# Patient Record
Sex: FEMALE | Race: OTHER | NOT HISPANIC OR LATINO
[De-identification: names, ages, dates, MRNs, and addresses within clinical notes are randomized per-mention and may not be internally consistent; named-entity substitution may affect disease eponyms.]

---

## 2017-01-25 ENCOUNTER — RESULT REVIEW (OUTPATIENT)
Age: 32
End: 2017-01-25

## 2017-07-31 ENCOUNTER — APPOINTMENT (OUTPATIENT)
Dept: RADIOLOGY | Facility: HOSPITAL | Age: 32
End: 2017-07-31

## 2017-07-31 ENCOUNTER — OUTPATIENT (OUTPATIENT)
Dept: OUTPATIENT SERVICES | Facility: HOSPITAL | Age: 32
LOS: 1 days | Discharge: HOME | End: 2017-07-31
Payer: MEDICAID

## 2017-07-31 DIAGNOSIS — Z30.2 ENCOUNTER FOR STERILIZATION: ICD-10-CM

## 2017-07-31 DIAGNOSIS — Z32.01 ENCOUNTER FOR PREGNANCY TEST, RESULT POSITIVE: ICD-10-CM

## 2017-07-31 LAB — HCG SERPL-ACNC: <1 MIU/ML — SIGNIFICANT CHANGE UP

## 2017-07-31 PROCEDURE — 58340 CATHETER FOR HYSTEROGRAPHY: CPT

## 2017-07-31 PROCEDURE — 74740 X-RAY FEMALE GENITAL TRACT: CPT | Mod: 26

## 2017-08-08 ENCOUNTER — EMERGENCY (EMERGENCY)
Facility: HOSPITAL | Age: 32
LOS: 1 days | Discharge: HOME | End: 2017-08-08
Attending: EMERGENCY MEDICINE | Admitting: EMERGENCY MEDICINE
Payer: OTHER MISCELLANEOUS

## 2017-08-08 VITALS
RESPIRATION RATE: 16 BRPM | DIASTOLIC BLOOD PRESSURE: 83 MMHG | OXYGEN SATURATION: 100 % | HEART RATE: 80 BPM | SYSTOLIC BLOOD PRESSURE: 129 MMHG | TEMPERATURE: 98 F

## 2017-08-08 DIAGNOSIS — Z87.39 PERSONAL HISTORY OF OTHER DISEASES OF THE MUSCULOSKELETAL SYSTEM AND CONNECTIVE TISSUE: Chronic | ICD-10-CM

## 2017-08-08 PROCEDURE — 99284 EMERGENCY DEPT VISIT MOD MDM: CPT | Mod: 25

## 2017-08-08 PROCEDURE — 99053 MED SERV 10PM-8AM 24 HR FAC: CPT

## 2017-08-08 RX ORDER — IBUPROFEN 200 MG
400 TABLET ORAL ONCE
Qty: 0 | Refills: 0 | Status: COMPLETED | OUTPATIENT
Start: 2017-08-08 | End: 2017-08-08

## 2017-08-08 RX ORDER — DIAZEPAM 5 MG
1 TABLET ORAL
Qty: 10 | Refills: 0 | OUTPATIENT
Start: 2017-08-08

## 2017-08-08 RX ORDER — IBUPROFEN 200 MG
1 TABLET ORAL
Qty: 12 | Refills: 0 | OUTPATIENT
Start: 2017-08-08

## 2017-08-08 RX ADMIN — Medication 400 MILLIGRAM(S): at 09:51

## 2017-08-08 NOTE — ED PROVIDER NOTE - CROS ED NEURO POS
DIFFICULTY WALKING / IMBALANCE/alternating, bilateral tingling and weakness of upper and lower extremities

## 2017-08-08 NOTE — ED ADULT TRIAGE NOTE - CHIEF COMPLAINT QUOTE
Pt. c/o lower back pain radiating to L leg started on Friday.  Pt. stated he have fallen a couple of months ago and has been suffering from back pain since.

## 2017-08-08 NOTE — ED PROVIDER NOTE - OBJECTIVE STATEMENT
33yo f w history chronic lower back pain and chronic right wrist pain secondary to fall in 2016 here complaining of 3 days worsening back pain and 2 days worsening wrist pain. 33yo f w history chronic lower back pain and chronic right wrist pain secondary to fall in 2016 here complaining of 3 days worsening back pain and 2 days worsening wrist pain. Has had mild back pain since injury in 2016 but acutely got worse. Located in lower back, radiates down legs to both sides, left > right. Also complains of intermittent bilateral weakness that alternates sides and intermittent numbness and tingling that also alternates sides 33yo f w history chronic lower back pain and chronic right wrist pain secondary to fall in 2016 here complaining of 3 days worsening back pain and 2 days worsening wrist pain. Has had mild back pain since injury in 2016 but acutely got worse. Located in lower back, radiates down legs to both sides, left > right. Also complains of intermittent bilateral weakness that alternates sides and intermittent numbness and tingling that also alternates sides. Denies urinary or fecal retention or incontinence. No saddle anesthesia. No IV drug use.   For wrist, had surgery for Dequervain's synovitis 2 mos ago, before which had pain localized in right radial hand, now pain has begun traveling up on arm. Was told she may have carpal tunnel syndrome; works in medical billing, typing throughout day. Follows with ortho for her wrist, and sees pain management who have been prescribing tramadol and lyrica for past 2 months. Denies pain in knees, upper arms. 33yo f w history chronic lower back pain and chronic right wrist pain secondary to fall in 2016 here complaining of 3 days worsening back pain and 2 days worsening wrist pain. Has had mild back pain since injury in 2016 but acutely got worse. Located in lower back, radiates down legs to both sides, left > right. Also complains of intermittent bilateral weakness that alternates sides and intermittent numbness and tingling that also alternates sides. Denies urinary or fecal retention or incontinence. No saddle anesthesia. No IV drug use.   For wrist, had surgery for Dequervain's synovitis 2 mos ago, before which had pain localized in right radial hand, now pain has begun traveling up on arm. Was told she may have carpal tunnel syndrome; works in medical billing, typing throughout day. Follows with ortho for her wrist, and sees pain management who have been prescribing tramadol and Lyrica for past 2 months. Denies pain in knees, upper arms, and other joints.

## 2017-08-08 NOTE — ED PROVIDER NOTE - PLAN OF CARE
You were seen today for your back pain.  It is likely due to sciatica, a pinched back nerve.  Take ibuprofen and the prescribed muscle relaxant as directed.  Be sure to follow up with your pain doctor in 2-3 days for re-evaluation.  RETURN TO THE EMERGENCY DEPARTMENT IMMEDIATELY IF YOU DEVELOP A FEVER, HAVE DIFFICULTY URINATING, CANNOT WALK, OR FOR ANY OTHER CONCERN.

## 2017-08-08 NOTE — ED PROVIDER NOTE - CARE PLAN
Principal Discharge DX:	Sciatica of left side  Instructions for follow-up, activity and diet:	You were seen today for your back pain.  It is likely due to sciatica, a pinched back nerve.  Take ibuprofen and the prescribed muscle relaxant as directed.  Be sure to follow up with your pain doctor in 2-3 days for re-evaluation.  RETURN TO THE EMERGENCY DEPARTMENT IMMEDIATELY IF YOU DEVELOP A FEVER, HAVE DIFFICULTY URINATING, CANNOT WALK, OR FOR ANY OTHER CONCERN.  Secondary Diagnosis:	Carpal tunnel syndrome of right wrist

## 2017-08-08 NOTE — ED PROVIDER NOTE - MEDICAL DECISION MAKING DETAILS
33yo female w history chronic pain here for acutely worsening lower back pain without features concerning for cord compression. Given pain elicited with straight leg on left, patient may have herniated lumbar disc. Will manage pain with trial of valium and ibuprofen. Right wrist exam with positive Tinel's and history frequent typing suggests carpal tunnel syndrome. Patient stable for discharge with outpatient orthopedic follow-up for both wrist and back pain.

## 2017-08-08 NOTE — ED PROVIDER NOTE - ATTENDING CONTRIBUTION TO CARE
DR. CAMACHO, ATTENDING MD-  I performed a face to face bedside interview with patient regarding history of present illness, review of symptoms and past medical history. I completed an independent physical exam.  I have discussed patient's plan of care with the resident.   Documentation as above in the note.    33 y/o female chronic back pain with acute worsening over past three days.  Back pain is left lower region, rad to lle.  Able to wt bear but worsens pain.  No ivda, trauma, ca hx, bladder/bowel incont.  Denies f/c, ha, neck stiffness, cp, sob, cough, abd pain, n/v/d, dysuria, rash.  Afebrile, vs wnl, uncomfortable, ctabil, s1s2 rrr no m/r/g, abd soft non ttp no r/g, back exam:  no midline ttp, no step offs, no erythema, ttp over b/l paraspinal lumbar region, pos slr test on left at 30 deg, pos tinels on right wrist distally nv intact, motor 5/5 x4 ext, distal sensory grossly intact x4 ext, antalgic gait but able to wt bear, normal heel to shin, normal finger to nose.  Exam and hx c/w carpal tunnel and sciatica, no signs of cord compression.  Advise nsaids, muscle relaxant prn, pain doc f/u.

## 2017-12-26 ENCOUNTER — TRANSCRIPTION ENCOUNTER (OUTPATIENT)
Age: 32
End: 2017-12-26

## 2018-05-15 ENCOUNTER — EMERGENCY (EMERGENCY)
Facility: HOSPITAL | Age: 33
LOS: 1 days | Discharge: HOME | End: 2018-05-15
Attending: EMERGENCY MEDICINE | Admitting: EMERGENCY MEDICINE
Payer: COMMERCIAL

## 2018-05-15 ENCOUNTER — TRANSCRIPTION ENCOUNTER (OUTPATIENT)
Age: 33
End: 2018-05-15

## 2018-05-15 VITALS
WEIGHT: 184.09 LBS | OXYGEN SATURATION: 98 % | SYSTOLIC BLOOD PRESSURE: 113 MMHG | DIASTOLIC BLOOD PRESSURE: 77 MMHG | TEMPERATURE: 98 F | HEART RATE: 74 BPM | RESPIRATION RATE: 17 BRPM | HEIGHT: 59 IN

## 2018-05-15 DIAGNOSIS — Z87.39 PERSONAL HISTORY OF OTHER DISEASES OF THE MUSCULOSKELETAL SYSTEM AND CONNECTIVE TISSUE: Chronic | ICD-10-CM

## 2018-05-15 PROCEDURE — 99283 EMERGENCY DEPT VISIT LOW MDM: CPT

## 2018-05-15 RX ORDER — IBUPROFEN 200 MG
600 TABLET ORAL ONCE
Qty: 0 | Refills: 0 | Status: COMPLETED | OUTPATIENT
Start: 2018-05-15 | End: 2018-05-15

## 2018-05-15 RX ORDER — LIDOCAINE 4 G/100G
1 CREAM TOPICAL ONCE
Qty: 0 | Refills: 0 | Status: COMPLETED | OUTPATIENT
Start: 2018-05-15 | End: 2018-05-15

## 2018-05-15 RX ORDER — DEXAMETHASONE 0.5 MG/5ML
8 ELIXIR ORAL ONCE
Qty: 0 | Refills: 0 | Status: COMPLETED | OUTPATIENT
Start: 2018-05-15 | End: 2018-05-15

## 2018-05-15 RX ADMIN — Medication 8 MILLIGRAM(S): at 19:47

## 2018-05-15 RX ADMIN — LIDOCAINE 1 PATCH: 4 CREAM TOPICAL at 19:50

## 2018-05-15 RX ADMIN — Medication 600 MILLIGRAM(S): at 19:49

## 2018-05-15 RX ADMIN — LIDOCAINE 1 PATCH: 4 CREAM TOPICAL at 19:47

## 2018-05-15 RX ADMIN — Medication 600 MILLIGRAM(S): at 19:47

## 2018-05-15 NOTE — ED ADULT TRIAGE NOTE - CHIEF COMPLAINT QUOTE
pain in the right wrist sp fall in 2017 had an MRI in March which showed two partial tears to the triangular fibrocartilage and scapholunate ligament has been taking gralise but for the past two days it hasn't been helping with her pain

## 2018-05-15 NOTE — ED PROVIDER NOTE - PROGRESS NOTE DETAILS
janeen: only mild improvement in pain, pt feels comfortable w/ f/u outpt ortho janeen: PT seen and reassessed.  Patient symptomatically improved.   AAOX3, NAD, VSS.  Discussed test results w/ patient. Patient verbalized understanding of hospital course and outpatient plans, has decisional making capacity.  Will f/u w/ pmd in the next few days; patient will call for an appointment. Will return to the ED if there is any worsening of symptoms.  Patient able to ambulate at baseline, is tolerating PO intake

## 2018-05-15 NOTE — ED PROVIDER NOTE - OBJECTIVE STATEMENT
32yo f p/w R wrist pain.  Had  fall in 2017 had an MRI in March which showed two partial tears to the triangular fibrocartilage and scapholunate ligament.  has been taking gabapentin but for the past 2d it hasn't been helping with her pain. 34yo f p/w R wrist pain. pain is most at R ulnar syloid process but goes around entire wrist. Had  ADITI in 2017 s/p dequervian synovitis surgery 1 year ago. had an MRI in March due to persistent pain which showed two partial tears to the triangular fibrocartilage and scapholunate ligament. is scheduled to have ligament repair w/ dr box but is awaiting insurance approval. has been taking gabapentin, 400mg advil but for the past 2d it hasn't been helping with her pain. ROS negative for: fever, c Nausea, vomiting, trauma, injury, redness of wrist 32yo f p/w R wrist pain. pain is most at R ulnar syloid process but goes around entire wrist. Had  ADITI in 2017 s/p dequervain synovitis surgery 1 year ago. had an MRI in March due to persistent pain which showed two partial tears to the triangular fibrocartilage and scapholunate ligament. is scheduled to have ligament repair w/ dr box but is awaiting insurance approval. has been taking gabapentin, 400mg advil but for the past 2d it hasn't been helping with her pain. ROS negative for: fever, c Nausea, vomiting, trauma, injury, redness of wrist 34yo f p/w R wrist pain. pain is most at R ulnar styloid process but goes around entire wrist. Had  ADITI in 2017 s/p dequervain synovitis surgery 1 year ago. had an MRI in March due to persistent pain which showed two partial tears to the triangular fibrocartilage and scapholunate ligament. is scheduled to have ligament repair w/ dr box but is awaiting insurance approval. has been taking gabapentin, 400mg advil but for the past 2d it hasn't been helping with her pain. ROS negative for: fever, Nausea, vomiting, trauma, injury, redness of wrist

## 2018-05-15 NOTE — ED ADULT NURSE NOTE - OBJECTIVE STATEMENT
32 yo female presents to ED for right wrist pain. She denies injury today. Pt states she had an injury to the right wrist 2 years ago that required surgical intervention. She has had chronic pain since then. Today comes to ED for 10/10 throbbing in the right wrist not relieved by advil or ice. Pt can range wrist in all directions and wiggle fingers. There is equal sensation orestes hands. There is no obvious deformity. She reports she normally has decreased strength is right hand due to pain and that is normal for her. R. wrist is tender to palpation. Cap refill <2 sec. Skin is warm and dry. She is well apparing.  at bedside. NAD. Safety and comfort maintained. ill continue to monitor.

## 2018-05-15 NOTE — ED PROVIDER NOTE - MEDICAL DECISION MAKING DETAILS
ttp styloid process ulnar & radial; + snuffbox ttp likely partial tears to the triangular fibrocartilage and scapholunate ligament. is scheduled to have ligament repair w/ dr box but is awaiting insurance approval. will give analgesia. no indications for xray currently given no trauma. clininically no suspicion for cellulitis, osteomyelitis, septic arthritis

## 2018-05-15 NOTE — ED PROVIDER NOTE - CHIEF COMPLAINT
The patient is a 33y Female complaining of The patient is a 33y Female complaining of right wrist pain.

## 2018-05-15 NOTE — ED PROVIDER NOTE - ATTENDING CONTRIBUTION TO CARE
34 y/o female with the above documented history and HPI who on exam appears well and comfortable. R wrist appears symmetric with the L s/ erythema, edema, warmth or ecchymosis. It exhibits a slightly reduced ROM secondary to pain. Neurovascularly intact. Nothing clinically evident to suggest any emergent process or alternative etiology of her pain; e.g., fracture, dislocation, septic arthritis, thromboembolic or other vascular event, etc. Provided analgesia here and to be discharged.

## 2018-05-21 PROCEDURE — 99283 EMERGENCY DEPT VISIT LOW MDM: CPT

## 2020-02-24 ENCOUNTER — APPOINTMENT (OUTPATIENT)
Dept: PAIN MANAGEMENT | Facility: CLINIC | Age: 35
End: 2020-02-24

## 2020-04-25 ENCOUNTER — MESSAGE (OUTPATIENT)
Age: 35
End: 2020-04-25

## 2020-08-17 ENCOUNTER — APPOINTMENT (OUTPATIENT)
Dept: PAIN MANAGEMENT | Facility: CLINIC | Age: 35
End: 2020-08-17

## 2021-03-12 ENCOUNTER — APPOINTMENT (OUTPATIENT)
Dept: ENDOCRINOLOGY | Facility: CLINIC | Age: 36
End: 2021-03-12

## 2021-03-22 ENCOUNTER — RESULT REVIEW (OUTPATIENT)
Age: 36
End: 2021-03-22

## 2022-04-18 ENCOUNTER — RESULT REVIEW (OUTPATIENT)
Age: 37
End: 2022-04-18

## 2023-08-04 ENCOUNTER — EMERGENCY (EMERGENCY)
Age: 38
LOS: 1 days | Discharge: HOME | End: 2023-08-04
Attending: EMERGENCY MEDICINE | Admitting: EMERGENCY MEDICINE
Payer: COMMERCIAL

## 2023-08-04 VITALS
OXYGEN SATURATION: 99 % | WEIGHT: 177.47 LBS | TEMPERATURE: 98 F | DIASTOLIC BLOOD PRESSURE: 86 MMHG | RESPIRATION RATE: 14 BRPM | SYSTOLIC BLOOD PRESSURE: 123 MMHG | HEART RATE: 79 BPM

## 2023-08-04 DIAGNOSIS — Z87.39 PERSONAL HISTORY OF OTHER DISEASES OF THE MUSCULOSKELETAL SYSTEM AND CONNECTIVE TISSUE: Chronic | ICD-10-CM

## 2023-08-04 PROCEDURE — 99284 EMERGENCY DEPT VISIT MOD MDM: CPT | Mod: 25

## 2023-08-04 PROCEDURE — 12011 RPR F/E/E/N/L/M 2.5 CM/<: CPT

## 2023-08-04 PROCEDURE — 93010 ELECTROCARDIOGRAM REPORT: CPT

## 2023-08-04 RX ORDER — TETANUS TOXOID, REDUCED DIPHTHERIA TOXOID AND ACELLULAR PERTUSSIS VACCINE, ADSORBED 5; 2.5; 8; 8; 2.5 [IU]/.5ML; [IU]/.5ML; UG/.5ML; UG/.5ML; UG/.5ML
0.5 SUSPENSION INTRAMUSCULAR ONCE
Refills: 0 | Status: COMPLETED | OUTPATIENT
Start: 2023-08-04 | End: 2023-08-04

## 2023-08-04 RX ADMIN — TETANUS TOXOID, REDUCED DIPHTHERIA TOXOID AND ACELLULAR PERTUSSIS VACCINE, ADSORBED 0.5 MILLILITER(S): 5; 2.5; 8; 8; 2.5 SUSPENSION INTRAMUSCULAR at 11:22

## 2023-08-04 NOTE — ED PROVIDER NOTE - RAPID ASSESSMENT
2cm lac to forehead after fall.  Pt clinically stable, +headache, no vomiting.  Transfer to adult side for full eval and closure, discussed w/ attg. -Laura Lares MD

## 2023-08-04 NOTE — ED PROVIDER NOTE - PATIENT PORTAL LINK FT
You can access the FollowMyHealth Patient Portal offered by [facility] by registering at the following website: [email or web address]. By joining [facility]’s FollowMyHealth portal, you will also be able to view your health information using other applications (apps) compatible with our system.

## 2023-08-04 NOTE — ED PROVIDER NOTE - NSFOLLOWUPINSTRUCTIONS_ED_ALL_ED_FT
You had repair of a laceration to your left forehead with 3 sutures.     Soap and water okay to rinse wound. Do not scrub wound.    Return to the ER or any other doctor in 5-7 days for suture removal.      Return to the ER for any persistent/worsening symptoms or if you experience any new symptoms such as fever, redness, numbness , you notice a new rash, increased irritation, abnormal discharge, or any concerning symptoms.
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
## 2023-08-04 NOTE — ED PROVIDER NOTE - CHIEF COMPLAINT
The patient is a 38y Female complaining of  The patient is a 38y Female complaining of injury to head.

## 2023-08-04 NOTE — ED PROVIDER NOTE - CLINICAL SUMMARY MEDICAL DECISION MAKING FREE TEXT BOX
Patient is a large pericardial effusion 38-year-old female no past medical history presenting with injury to head. Does not meet criteeria for head CT by [place] CT rule. On exam patient with 1 cm laceration to left eyebrow.  Minimal bleeding.  No other signs of trauma.  Patient to get Tdap. Will repair laceration.
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
## 2023-08-04 NOTE — ED PROVIDER NOTE - PROGRESS NOTE DETAILS
Oneida Kenney MD PGY3: Laceration repaired with 3 sutures.  Patient to be discharged.  Instructed to follow-up for suture removal.
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
## 2023-08-04 NOTE — ED PROVIDER NOTE - PHYSICAL EXAMINATION
GENERAL: no acute distress, non-toxic appearing  HEAD: normocephalic, 1cm laceration to left forehead above left eyebrow  HEENT: PERRLA, EOMI, normal conjunctiva  CARDIAC: regular rate and rhythm  PULM: clear to ascultation bilaterally  NEURO: alert and oriented x 3, normal speech, no gross neurologic deficit  MSK: no visible deformities  SKIN: no visible rashes, dry, well-perfused

## 2023-10-17 ENCOUNTER — APPOINTMENT (OUTPATIENT)
Dept: BARIATRICS/WEIGHT MGMT | Facility: CLINIC | Age: 38
End: 2023-10-17
Payer: COMMERCIAL

## 2023-10-17 ENCOUNTER — OUTPATIENT (OUTPATIENT)
Dept: OUTPATIENT SERVICES | Facility: HOSPITAL | Age: 38
LOS: 1 days | End: 2023-10-17
Payer: COMMERCIAL

## 2023-10-17 VITALS — WEIGHT: 180 LBS | HEIGHT: 59 IN | BODY MASS INDEX: 36.29 KG/M2

## 2023-10-17 DIAGNOSIS — E11.9 TYPE 2 DIABETES MELLITUS W/OUT COMPLICATIONS: ICD-10-CM

## 2023-10-17 DIAGNOSIS — Z87.39 PERSONAL HISTORY OF OTHER DISEASES OF THE MUSCULOSKELETAL SYSTEM AND CONNECTIVE TISSUE: Chronic | ICD-10-CM

## 2023-10-17 DIAGNOSIS — E66.9 OBESITY, UNSPECIFIED: ICD-10-CM

## 2023-10-17 DIAGNOSIS — I10 ESSENTIAL (PRIMARY) HYPERTENSION: ICD-10-CM

## 2023-10-17 PROCEDURE — G0463: CPT

## 2023-10-17 PROCEDURE — 99205 OFFICE O/P NEW HI 60 MIN: CPT | Mod: 95

## 2023-10-17 RX ORDER — TIRZEPATIDE 5 MG/.5ML
5 INJECTION, SOLUTION SUBCUTANEOUS
Qty: 1 | Refills: 2 | Status: ACTIVE | COMMUNITY
Start: 2023-10-17 | End: 1900-01-01

## 2023-10-19 ENCOUNTER — TRANSCRIPTION ENCOUNTER (OUTPATIENT)
Age: 38
End: 2023-10-19

## 2023-10-19 ENCOUNTER — NON-APPOINTMENT (OUTPATIENT)
Age: 38
End: 2023-10-19

## 2023-10-25 DIAGNOSIS — E66.9 OBESITY, UNSPECIFIED: ICD-10-CM

## 2023-10-25 DIAGNOSIS — E11.9 TYPE 2 DIABETES MELLITUS WITHOUT COMPLICATIONS: ICD-10-CM

## 2023-12-11 ENCOUNTER — APPOINTMENT (OUTPATIENT)
Dept: BARIATRICS/WEIGHT MGMT | Facility: CLINIC | Age: 38
End: 2023-12-11

## 2025-03-21 ENCOUNTER — OUTPATIENT (OUTPATIENT)
Dept: OUTPATIENT SERVICES | Facility: HOSPITAL | Age: 40
LOS: 1 days | End: 2025-03-21
Payer: COMMERCIAL

## 2025-03-21 ENCOUNTER — APPOINTMENT (OUTPATIENT)
Dept: ULTRASOUND IMAGING | Facility: IMAGING CENTER | Age: 40
End: 2025-03-21
Payer: COMMERCIAL

## 2025-03-21 ENCOUNTER — APPOINTMENT (OUTPATIENT)
Dept: MAMMOGRAPHY | Facility: IMAGING CENTER | Age: 40
End: 2025-03-21
Payer: COMMERCIAL

## 2025-03-21 DIAGNOSIS — Z87.39 PERSONAL HISTORY OF OTHER DISEASES OF THE MUSCULOSKELETAL SYSTEM AND CONNECTIVE TISSUE: Chronic | ICD-10-CM

## 2025-03-21 DIAGNOSIS — Z00.8 ENCOUNTER FOR OTHER GENERAL EXAMINATION: ICD-10-CM

## 2025-03-21 PROCEDURE — 76641 ULTRASOUND BREAST COMPLETE: CPT | Mod: 26,50

## 2025-03-21 PROCEDURE — 77067 SCR MAMMO BI INCL CAD: CPT

## 2025-03-21 PROCEDURE — 77063 BREAST TOMOSYNTHESIS BI: CPT | Mod: 26

## 2025-03-21 PROCEDURE — 77067 SCR MAMMO BI INCL CAD: CPT | Mod: 26

## 2025-03-21 PROCEDURE — 77063 BREAST TOMOSYNTHESIS BI: CPT

## 2025-03-21 PROCEDURE — 76641 ULTRASOUND BREAST COMPLETE: CPT

## 2025-05-21 ENCOUNTER — OUTPATIENT (OUTPATIENT)
Dept: OUTPATIENT SERVICES | Facility: HOSPITAL | Age: 40
LOS: 1 days | Discharge: HOME | End: 2025-05-21

## 2025-05-21 DIAGNOSIS — D64.9 ANEMIA, UNSPECIFIED: ICD-10-CM

## 2025-05-21 DIAGNOSIS — Z87.39 PERSONAL HISTORY OF OTHER DISEASES OF THE MUSCULOSKELETAL SYSTEM AND CONNECTIVE TISSUE: Chronic | ICD-10-CM

## 2025-05-22 ENCOUNTER — APPOINTMENT (OUTPATIENT)
Dept: HEMATOLOGY ONCOLOGY | Facility: CLINIC | Age: 40
End: 2025-05-22
Payer: COMMERCIAL

## 2025-05-22 DIAGNOSIS — D50.8 OTHER IRON DEFICIENCY ANEMIAS: ICD-10-CM

## 2025-05-22 PROCEDURE — 99204 OFFICE O/P NEW MOD 45 MIN: CPT | Mod: 95

## 2025-05-30 LAB
ALBUMIN SERPL ELPH-MCNC: 4.5 G/DL
ALP BLD-CCNC: 120 U/L
ALT SERPL-CCNC: 20 U/L
ANION GAP SERPL CALC-SCNC: 11 MMOL/L
AST SERPL-CCNC: 17 U/L
BILIRUB SERPL-MCNC: 0.5 MG/DL
BUN SERPL-MCNC: 12 MG/DL
CALCIUM SERPL-MCNC: 9.9 MG/DL
CHLORIDE SERPL-SCNC: 103 MMOL/L
CO2 SERPL-SCNC: 23 MMOL/L
CREAT SERPL-MCNC: 0.65 MG/DL
CRP SERPL-MCNC: 16 MG/L
EGFRCR SERPLBLD CKD-EPI 2021: 113 ML/MIN/1.73M2
FERRITIN SERPL-MCNC: 36 NG/ML
FOLATE SERPL-MCNC: >20 NG/ML
GLUCOSE SERPL-MCNC: 124 MG/DL
HAPTOGLOB SERPL-MCNC: 216 MG/DL
IRON SATN MFR SERPL: 14 %
IRON SERPL-MCNC: 50 UG/DL
LDH SERPL-CCNC: 140 U/L
POTASSIUM SERPL-SCNC: 4.6 MMOL/L
PROT SERPL-MCNC: 7.8 G/DL
SODIUM SERPL-SCNC: 137 MMOL/L
TIBC SERPL-MCNC: 357 UG/DL
UIBC SERPL-MCNC: 307 UG/DL
VIT B12 SERPL-MCNC: 1392 PG/ML

## 2025-05-31 LAB
BASOPHILS # BLD AUTO: 0.03 K/UL
BASOPHILS NFR BLD AUTO: 0.3 %
EOSINOPHIL # BLD AUTO: 0.29 K/UL
EOSINOPHIL NFR BLD AUTO: 2.9 %
ERYTHROCYTE [SEDIMENTATION RATE] IN BLOOD BY WESTERGREN METHOD: 34 MM/HR
HCT VFR BLD CALC: 39.6 %
HGB BLD-MCNC: 12.1 G/DL
IMM GRANULOCYTES NFR BLD AUTO: 0.3 %
LYMPHOCYTES # BLD AUTO: 3.19 K/UL
LYMPHOCYTES NFR BLD AUTO: 31.6 %
MAN DIFF?: NORMAL
MCHC RBC-ENTMCNC: 25.5 PG
MCHC RBC-ENTMCNC: 30.6 G/DL
MCV RBC AUTO: 83.4 FL
MONOCYTES # BLD AUTO: 0.66 K/UL
MONOCYTES NFR BLD AUTO: 6.5 %
NEUTROPHILS # BLD AUTO: 5.89 K/UL
NEUTROPHILS NFR BLD AUTO: 58.4 %
PLATELET # BLD AUTO: 357 K/UL
RBC # BLD: 4.75 M/UL
RBC # BLD: 4.75 M/UL
RBC # FLD: 15.5 %
RETICS # AUTO: 1.6 %
RETICS AGGREG/RBC NFR: 74.6 K/UL
WBC # FLD AUTO: 10.09 K/UL

## 2025-06-23 ENCOUNTER — APPOINTMENT (OUTPATIENT)
Dept: HEMATOLOGY ONCOLOGY | Facility: CLINIC | Age: 40
End: 2025-06-23
Payer: COMMERCIAL

## 2025-06-23 PROCEDURE — 99203 OFFICE O/P NEW LOW 30 MIN: CPT | Mod: 95

## 2025-06-23 RX ORDER — CHLORHEXIDINE GLUCONATE 4 %
325 (65 FE) LIQUID (ML) TOPICAL TWICE DAILY
Qty: 60 | Refills: 4 | Status: ACTIVE | COMMUNITY
Start: 2025-06-23 | End: 1900-01-01

## 2025-06-27 ENCOUNTER — APPOINTMENT (OUTPATIENT)
Dept: HEMATOLOGY ONCOLOGY | Facility: CLINIC | Age: 40
End: 2025-06-27